# Patient Record
Sex: FEMALE | Race: WHITE
[De-identification: names, ages, dates, MRNs, and addresses within clinical notes are randomized per-mention and may not be internally consistent; named-entity substitution may affect disease eponyms.]

---

## 2018-03-26 ENCOUNTER — HOSPITAL ENCOUNTER (OUTPATIENT)
Dept: HOSPITAL 45 - C.MAMM | Age: 23
Discharge: HOME | End: 2018-03-26
Attending: PEDIATRICS
Payer: COMMERCIAL

## 2018-03-26 DIAGNOSIS — D64.9: ICD-10-CM

## 2018-03-26 DIAGNOSIS — F50.9: Primary | ICD-10-CM

## 2018-03-26 DIAGNOSIS — N91.2: ICD-10-CM

## 2018-08-10 ENCOUNTER — HOSPITAL ENCOUNTER (OUTPATIENT)
Dept: HOSPITAL 45 - C.LABBC | Age: 23
Discharge: HOME | End: 2018-08-10
Attending: PEDIATRICS
Payer: COMMERCIAL

## 2018-08-10 DIAGNOSIS — R10.31: Primary | ICD-10-CM

## 2018-08-10 DIAGNOSIS — F50.9: ICD-10-CM

## 2018-08-10 LAB
BASOPHILS # BLD: 0.03 K/UL (ref 0–0.2)
BASOPHILS NFR BLD: 0.5 %
EOS ABS #: 0.09 K/UL (ref 0–0.5)
EOSINOPHIL NFR BLD AUTO: 285 K/UL (ref 130–400)
HCT VFR BLD CALC: 43.1 % (ref 37–47)
HGB BLD-MCNC: 14.2 G/DL (ref 12–16)
IG#: 0.01 K/UL (ref 0–0.02)
IMM GRANULOCYTES NFR BLD AUTO: 14.4 %
LYMPHOCYTES # BLD: 0.83 K/UL (ref 1.2–3.4)
MCH RBC QN AUTO: 32 PG (ref 25–34)
MCHC RBC AUTO-ENTMCNC: 32.9 G/DL (ref 32–36)
MCV RBC AUTO: 97.1 FL (ref 80–100)
MONO ABS #: 0.41 K/UL (ref 0.11–0.59)
MONOCYTES NFR BLD: 7.1 %
NEUT ABS #: 4.38 K/UL (ref 1.4–6.5)
NEUTROPHILS # BLD AUTO: 1.6 %
NEUTROPHILS NFR BLD AUTO: 76.2 %
PMV BLD AUTO: 10.4 FL (ref 7.4–10.4)
RED CELL DISTRIBUTION WIDTH CV: 12.3 % (ref 11.5–14.5)
RED CELL DISTRIBUTION WIDTH SD: 43.4 FL (ref 36.4–46.3)
WBC # BLD AUTO: 5.75 K/UL (ref 4.8–10.8)

## 2018-08-14 ENCOUNTER — HOSPITAL ENCOUNTER (OUTPATIENT)
Dept: HOSPITAL 45 - C.ULTR | Age: 23
Discharge: HOME | End: 2018-08-14
Attending: PEDIATRICS
Payer: COMMERCIAL

## 2018-08-14 DIAGNOSIS — R10.32: ICD-10-CM

## 2018-08-14 DIAGNOSIS — R10.31: Primary | ICD-10-CM

## 2018-08-14 NOTE — DIAGNOSTIC IMAGING REPORT
ABDOMEN COMPLETE (US)



CLINICAL HISTORY: Abdominal pain.    



COMPARISON STUDY:  [facility] April 18, 2015.



FINDINGS: Liver is sonographically normal. There is no biliary ductal

dilatation. The gallbladder is normal. There are no gallstones. No pancreatic

abnormality is identified. The size of the spleen is normal. Caliber of the

abdominal aorta is normal. Visualized portions of the IVC are patent. The right

kidney measures 10.6 cm in maximal dimension and the left measures 10.1 cm.

Renal echogenicity, size and cortical thickness are normal. There is no

hydronephrosis. No calculi or masses are identified. There is no ascites.



IMPRESSION:  Normal abdominal ultrasound. 









Electronically signed by:  Javier Glover M.D.

8/14/2018 9:05 AM



Dictated Date/Time:  8/14/2018 9:03 AM